# Patient Record
Sex: MALE | Race: WHITE | NOT HISPANIC OR LATINO | Employment: OTHER | URBAN - METROPOLITAN AREA
[De-identification: names, ages, dates, MRNs, and addresses within clinical notes are randomized per-mention and may not be internally consistent; named-entity substitution may affect disease eponyms.]

---

## 2017-01-01 ENCOUNTER — TELEPHONE (OUTPATIENT)
Dept: FAMILY MEDICINE CLINIC | Facility: CLINIC | Age: 82
End: 2017-01-01

## 2017-01-01 ENCOUNTER — OFFICE VISIT (OUTPATIENT)
Dept: FAMILY MEDICINE CLINIC | Facility: CLINIC | Age: 82
End: 2017-01-01

## 2017-01-01 ENCOUNTER — OFFICE VISIT (OUTPATIENT)
Dept: SURGERY | Facility: CLINIC | Age: 82
End: 2017-01-01

## 2017-01-01 ENCOUNTER — OUTSIDE FACILITY SERVICE (OUTPATIENT)
Dept: FAMILY MEDICINE CLINIC | Facility: CLINIC | Age: 82
End: 2017-01-01

## 2017-01-01 ENCOUNTER — ANESTHESIA EVENT (OUTPATIENT)
Dept: PERIOP | Facility: HOSPITAL | Age: 82
End: 2017-01-01

## 2017-01-01 ENCOUNTER — ANESTHESIA (OUTPATIENT)
Dept: PERIOP | Facility: HOSPITAL | Age: 82
End: 2017-01-01

## 2017-01-01 VITALS
RESPIRATION RATE: 16 BRPM | SYSTOLIC BLOOD PRESSURE: 115 MMHG | BODY MASS INDEX: 22.9 KG/M2 | OXYGEN SATURATION: 94 % | HEIGHT: 70 IN | HEART RATE: 70 BPM | WEIGHT: 160 LBS | TEMPERATURE: 98 F | DIASTOLIC BLOOD PRESSURE: 60 MMHG

## 2017-01-01 DIAGNOSIS — I10 ESSENTIAL HYPERTENSION: ICD-10-CM

## 2017-01-01 DIAGNOSIS — Z09 FOLLOW UP: Primary | ICD-10-CM

## 2017-01-01 DIAGNOSIS — D50.0 IRON DEFICIENCY ANEMIA DUE TO CHRONIC BLOOD LOSS: Primary | ICD-10-CM

## 2017-01-01 PROCEDURE — 25010000002 ONDANSETRON PER 1 MG: Performed by: NURSE ANESTHETIST, CERTIFIED REGISTERED

## 2017-01-01 PROCEDURE — 99213 OFFICE O/P EST LOW 20 MIN: CPT | Performed by: INTERNAL MEDICINE

## 2017-01-01 PROCEDURE — 99305 1ST NF CARE MODERATE MDM 35: CPT | Performed by: INTERNAL MEDICINE

## 2017-01-01 PROCEDURE — 25010000002 HYDROMORPHONE PER 4 MG: Performed by: NURSE ANESTHETIST, CERTIFIED REGISTERED

## 2017-01-01 PROCEDURE — 25010000002 PROPOFOL 10 MG/ML EMULSION: Performed by: NURSE ANESTHETIST, CERTIFIED REGISTERED

## 2017-01-01 PROCEDURE — 25010000002 PHENYLEPHRINE PER 1 ML: Performed by: NURSE ANESTHETIST, CERTIFIED REGISTERED

## 2017-01-01 PROCEDURE — 99024 POSTOP FOLLOW-UP VISIT: CPT | Performed by: SURGERY

## 2017-01-01 PROCEDURE — 25010000003 CEFAZOLIN IN DEXTROSE 2-4 GM/100ML-% SOLUTION: Performed by: PHYSICIAN ASSISTANT

## 2017-01-01 PROCEDURE — 25010000002 DEXAMETHASONE PER 1 MG: Performed by: NURSE ANESTHETIST, CERTIFIED REGISTERED

## 2017-01-01 PROCEDURE — 25010000002 FENTANYL CITRATE (PF) 100 MCG/2ML SOLUTION: Performed by: NURSE ANESTHETIST, CERTIFIED REGISTERED

## 2017-01-01 PROCEDURE — 25010000002 NEOSTIGMINE 10 MG/10ML SOLUTION: Performed by: NURSE ANESTHETIST, CERTIFIED REGISTERED

## 2017-01-01 RX ORDER — DEXAMETHASONE SODIUM PHOSPHATE 10 MG/ML
INJECTION INTRAMUSCULAR; INTRAVENOUS AS NEEDED
Status: DISCONTINUED | OUTPATIENT
Start: 2017-01-01 | End: 2017-01-01 | Stop reason: SURG

## 2017-01-01 RX ORDER — ONDANSETRON 2 MG/ML
INJECTION INTRAMUSCULAR; INTRAVENOUS AS NEEDED
Status: DISCONTINUED | OUTPATIENT
Start: 2017-01-01 | End: 2017-01-01 | Stop reason: SURG

## 2017-01-01 RX ORDER — NEOSTIGMINE METHYLSULFATE 1 MG/ML
INJECTION, SOLUTION INTRAVENOUS AS NEEDED
Status: DISCONTINUED | OUTPATIENT
Start: 2017-01-01 | End: 2017-01-01 | Stop reason: SURG

## 2017-01-01 RX ORDER — ROCURONIUM BROMIDE 10 MG/ML
INJECTION, SOLUTION INTRAVENOUS AS NEEDED
Status: DISCONTINUED | OUTPATIENT
Start: 2017-01-01 | End: 2017-01-01 | Stop reason: SURG

## 2017-01-01 RX ORDER — HYDROMORPHONE HCL 110MG/55ML
PATIENT CONTROLLED ANALGESIA SYRINGE INTRAVENOUS AS NEEDED
Status: DISCONTINUED | OUTPATIENT
Start: 2017-01-01 | End: 2017-01-01 | Stop reason: SURG

## 2017-01-01 RX ORDER — LIDOCAINE HYDROCHLORIDE 20 MG/ML
INJECTION, SOLUTION INFILTRATION; PERINEURAL AS NEEDED
Status: DISCONTINUED | OUTPATIENT
Start: 2017-01-01 | End: 2017-01-01 | Stop reason: SURG

## 2017-01-01 RX ORDER — GLYCOPYRROLATE 0.2 MG/ML
INJECTION INTRAMUSCULAR; INTRAVENOUS AS NEEDED
Status: DISCONTINUED | OUTPATIENT
Start: 2017-01-01 | End: 2017-01-01 | Stop reason: SURG

## 2017-01-01 RX ORDER — PROPOFOL 10 MG/ML
VIAL (ML) INTRAVENOUS AS NEEDED
Status: DISCONTINUED | OUTPATIENT
Start: 2017-01-01 | End: 2017-01-01 | Stop reason: SURG

## 2017-01-01 RX ORDER — FENTANYL CITRATE 50 UG/ML
INJECTION, SOLUTION INTRAMUSCULAR; INTRAVENOUS AS NEEDED
Status: DISCONTINUED | OUTPATIENT
Start: 2017-01-01 | End: 2017-01-01 | Stop reason: SURG

## 2017-01-01 RX ADMIN — ROCURONIUM BROMIDE 20 MG: 10 INJECTION INTRAVENOUS at 08:45

## 2017-01-01 RX ADMIN — GLYCOPYRROLATE 0.4 MG: 0.2 INJECTION INTRAMUSCULAR; INTRAVENOUS at 09:28

## 2017-01-01 RX ADMIN — NEOSTIGMINE METHYLSULFATE 2 MG: 1 INJECTION INTRAVENOUS at 09:28

## 2017-01-01 RX ADMIN — LIDOCAINE HYDROCHLORIDE 60 MG: 20 INJECTION, SOLUTION INFILTRATION; PERINEURAL at 07:41

## 2017-01-01 RX ADMIN — GLYCOPYRROLATE 0.5 MG: 0.2 INJECTION INTRAMUSCULAR; INTRAVENOUS at 09:22

## 2017-01-01 RX ADMIN — HYDROMORPHONE HYDROCHLORIDE 0.4 MG: 2 INJECTION, SOLUTION INTRAMUSCULAR; INTRAVENOUS; SUBCUTANEOUS at 09:02

## 2017-01-01 RX ADMIN — NEOSTIGMINE METHYLSULFATE 3 MG: 1 INJECTION INTRAVENOUS at 09:22

## 2017-01-01 RX ADMIN — FENTANYL CITRATE 50 MCG: 50 INJECTION INTRAMUSCULAR; INTRAVENOUS at 08:05

## 2017-01-01 RX ADMIN — CEFAZOLIN SODIUM 2 G: 2 INJECTION, SOLUTION INTRAVENOUS at 07:39

## 2017-01-01 RX ADMIN — ONDANSETRON 4 MG: 2 INJECTION INTRAMUSCULAR; INTRAVENOUS at 09:01

## 2017-01-01 RX ADMIN — SODIUM CHLORIDE, POTASSIUM CHLORIDE, SODIUM LACTATE AND CALCIUM CHLORIDE: 600; 310; 30; 20 INJECTION, SOLUTION INTRAVENOUS at 07:38

## 2017-01-01 RX ADMIN — PHENYLEPHRINE HYDROCHLORIDE 100 MCG: 10 INJECTION INTRAVENOUS at 08:04

## 2017-01-01 RX ADMIN — PHENYLEPHRINE HYDROCHLORIDE 100 MCG: 10 INJECTION INTRAVENOUS at 08:26

## 2017-01-01 RX ADMIN — ROCURONIUM BROMIDE 10 MG: 10 INJECTION INTRAVENOUS at 08:11

## 2017-01-01 RX ADMIN — ROCURONIUM BROMIDE 40 MG: 10 INJECTION INTRAVENOUS at 07:41

## 2017-01-01 RX ADMIN — FENTANYL CITRATE 50 MCG: 50 INJECTION INTRAMUSCULAR; INTRAVENOUS at 07:38

## 2017-01-01 RX ADMIN — PHENYLEPHRINE HYDROCHLORIDE 100 MCG: 10 INJECTION INTRAVENOUS at 07:54

## 2017-01-01 RX ADMIN — PROPOFOL 140 MG: 10 INJECTION, EMULSION INTRAVENOUS at 07:41

## 2017-01-01 RX ADMIN — PHENYLEPHRINE HYDROCHLORIDE 100 MCG: 10 INJECTION INTRAVENOUS at 08:46

## 2017-01-01 RX ADMIN — DEXAMETHASONE SODIUM PHOSPHATE 8 MG: 10 INJECTION INTRAMUSCULAR; INTRAVENOUS at 08:08

## 2017-01-03 PROBLEM — C18.2 MALIGNANT NEOPLASM OF ASCENDING COLON (HCC): Status: ACTIVE | Noted: 2017-01-01

## 2017-01-03 NOTE — ANESTHESIA PROCEDURE NOTES
Airway  Urgency: elective    Date/Time: 1/3/2017 7:46 AM  Airway not difficult    General Information and Staff    Patient location during procedure: OR  Anesthesiologist: NGUYỄN MCLEOD  CRNA: ANABELA CERVANTES    Indications and Patient Condition  Indications for airway management: airway protection    Preoxygenated: yes  MILS not maintained throughout  Mask difficulty assessment: 1 - vent by mask    Final Airway Details  Final airway type: endotracheal airway      Successful airway: ETT  Cuffed: yes   Successful intubation technique: direct laryngoscopy  Endotracheal tube insertion site: oral  Blade: Laura  Blade size: #3  ETT size: 8.0 mm  Cormack-Lehane Classification: grade I - full view of glottis  Placement verified by: chest auscultation and capnometry   Cuff volume (mL): 9  Measured from: lips  ETT to lips (cm): 24  Number of attempts at approach: 1    Additional Comments  Pt preoxygenated prior to induction, easy mask airway, atraumatic intubation,permanent dentures intact, + ETCO2, + bs bilat,  ETT secured and connected to ventilator.

## 2017-01-03 NOTE — ANESTHESIA PREPROCEDURE EVALUATION
Anesthesia Evaluation      Airway   Mallampati: II  TM distance: >3 FB  Neck ROM: full  no difficulty expected  Dental      Comment: Pt states dentures are permanently placed, possible damage was discussed    Pulmonary    (+) shortness of breath, sleep apnea (does not use CPAP),   Cardiovascular   (+) hypertension, past MI , CAD, CABG, dysrhythmias (pacemaker) Atrial Fib, CHF, PND,     Neuro/Psych  GI/Hepatic/Renal/Endo    (+)  hiatal hernia,     ROS Comment: Colon Ca    Musculoskeletal     Abdominal    Substance History      OB/GYN          Other                             Anesthesia Plan    ASA 4     general     intravenous induction   Anesthetic plan and risks discussed with patient.

## 2017-01-03 NOTE — ANESTHESIA POSTPROCEDURE EVALUATION
Patient: Richar Cee    Procedure Summary     Date Anesthesia Start Anesthesia Stop Room / Location    01/03/17 0738 0941  [facility] / [facility]       Procedure Diagnosis Surgeon Provider    LAPAROSCOPIC RIGHT COLON RESECTION (Right Abdomen) Malignant neoplasm of ascending colon  (Malignant neoplasm of ascending colon [C18.2]) MD Agueda Ibarra MD          Anesthesia Type: general  Last vitals  /69 (01/03/17 1600)    Temp      Pulse 69 (01/03/17 1600)   Resp 15 (01/03/17 1600)    SpO2 99 % (01/03/17 1600)      Post Anesthesia Care and Evaluation    Patient location during evaluation: PACU  Patient participation: complete - patient participated  Level of consciousness: awake and alert  Pain management: adequate  Airway patency: patent  Anesthetic complications: no  Cardiovascular status: acceptable  Respiratory status: acceptable  Hydration status: acceptable

## 2017-01-12 NOTE — PROGRESS NOTES
POSTOP VISIT    Laparoscopic right colectomy 1/3/17  T4a N1a tumor    Abdomen soft, NT, ND, incisions well healed.  Low appetite.    I discussed with him and his wife the pathology report and the option of seeing oncology to discuss adjuvant want treatment.  We discussed what would be involved and the pros and cons particularly at his age of 89.  I discussed this with him while he was in the hospital as well and he and his wife have thought about it and talk about it since then and after our discussion today he has no interest in pursuing further evaluation and/or treatment.  I agree with his decision wholeheartedly.  I'm going to put him in for a two-year surveillance colonoscopy reminder, but obviously his age and health will need to be taken into consideration prior to deciding to pursue that further.

## 2017-01-12 NOTE — MR AVS SNAPSHOT
Richar Cee   1/12/2017 10:15 AM   Office Visit    Dept Phone:  [phone]   Encounter #:  [id number]    Provider:  Tray Pozo MD   Department:  [facility] MED                Your Full Care Plan              Your Updated Medication List          This list is accurate as of: 1/12/17 10:34 AM.  Always use your most recent med list.                atorvastatin 80 MG tablet   Commonly known as:  LIPITOR       furosemide 80 MG tablet   Commonly known as:  LASIX       isosorbide mononitrate 60 MG 24 hr tablet   Commonly known as:  IMDUR       lansoprazole 30 MG capsule   Commonly known as:  PREVACID       metoprolol succinate XL 25 MG 24 hr tablet   Commonly known as:  TOPROL-XL       nitroglycerin 0.4 MG SL tablet   Commonly known as:  NITROSTAT       potassium chloride 20 MEQ CR tablet   Commonly known as:  K-DUR,KLOR-CON       ramipril 5 MG capsule   Commonly known as:  ALTACE       tamsulosin 0.4 MG capsule 24 hr capsule   Commonly known as:  FLOMAX               Instructions     None    Patient Instructions History      Upcoming Appointments     Visit Type Date Time Department    FOLLOW UP 1/12/2017 10:15 AM [facility]    POST-OP 1/12/2017  1:30 PM [facility]      MyChart Signup     Our records indicate that you have declined [facility] signup. If you would like to sign up for Nursing Home [facility], please email [email or web address] or call [phone] to obtain an activation code.             Other Info from Your Visit           Your Appointments     Jan 12, 2017  1:30 PM EST   Post-Op with Curt Schwarz MD   [facility] GENERAL SURGERY (--)    [address] [place] [address] [address]  [facility] [id number] [phone]              Allergies     No Known Allergies      Reason for Visit     Follow-up follow-up from hospital stay were a spot was removed from colon       Vital Signs     Blood Pressure Pulse  "Temperature Respirations Height Weight    115/60 70 98 °F (36.7 °C) (Oral) 16 70\" (177.8 cm) 160 lb (72.6 kg)    Oxygen Saturation Body Mass Index Smoking Status             94% 22.96 kg/m2 Former Smoker           "

## 2017-01-12 NOTE — PROGRESS NOTES
"Analia Cee is a 89 y.o. male. Patient is here today for   Chief Complaint   Patient presents with   • Follow-up     follow-up from hospital stay were a spot was removed from colon           Vitals:    01/12/17 1009   BP: 115/60   Pulse: 70   Resp: 16   Temp: 98 °F (36.7 °C)   SpO2: 94%       Past Medical History   Diagnosis Date   • Anemia 10/2016     5.6 IN SEPT 2016   5 UNITS OF BLOOD GIVEN   • Anticoagulated      ELIQUIS STOPPED IN SEPT WHILE HOSPITALIZED FOR HGB 5.6   • Atrial fibrillation      ELIQUIS, PLAVIX AND ASPIRIN STOPPED IN SEPT   HGB 5.6   • BPH (benign prostatic hyperplasia)    • CAD (coronary artery disease)    • CHF (congestive heart failure)    • Colon cancer 10/2016     Invasive Adenocarcinoma   • Colonic mass      CECUM CANCEROUS MASS   • Diverticulosis    • Hiatal hernia    • Hiatal hernia      NO PROBLEMS   • [place] (hard of hearing)      JULIANNA AIDS   • Hyperlipidemia    • Melanoma 1980     right shoulder   • Myocardial infarction    • GUI (obstructive sleep apnea)      has a CPAP-does not use   • Scoliosis concern      \"HE CANT LAY FLAT\"   • 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     Social History Main Topics   • Smoking status: Former Smoker     Years: 10.00     Types: Cigars     Quit date: 1986   • Smokeless tobacco: Never Used      Comment: ONLY SMOKED CIGARS WHEN I WAS DRIVING   • Alcohol use No   • Drug use: No   • Sexual activity: Not on file     Other Topics Concern   • Not on file     Social History Narrative        Current Outpatient Prescriptions:   •  atorvastatin (LIPITOR) 80 MG tablet, Take 80 mg by mouth Every Night., Disp: , Rfl:   •  furosemide (LASIX) 80 MG tablet, Take 80 mg by mouth 2 (Two) " "Times a Day. SOMETIMES I JUST TAKE ONE TABLET, Disp: , Rfl:   •  isosorbide mononitrate (IMDUR) 60 MG 24 hr tablet, Take 60 mg by mouth Every Morning., Disp: , Rfl:   •  lansoprazole (PREVACID) 30 MG capsule, Take 30 mg by mouth Every Morning., Disp: , Rfl:   •  metoprolol succinate XL (TOPROL-XL) 25 MG 24 hr tablet, Take 25 mg by mouth Every Morning., Disp: , Rfl:   •  nitroglycerin (NITROSTAT) 0.4 MG SL tablet, Place 0.4 mg under the tongue As Needed., Disp: , Rfl:   •  potassium chloride (K-DUR,KLOR-CON) 20 MEQ CR tablet, Take 20 mEq by mouth 2 (Two) Times a Day. STATED \"SOMEIMES I ONLY TAKE IT ONCE A DAY\", Disp: , Rfl:   •  ramipril (ALTACE) 5 MG capsule, Take 5 mg by mouth Every Morning., Disp: , Rfl:   •  tamsulosin (FLOMAX) 0.4 MG capsule 24 hr capsule, Take 1 capsule by mouth Daily. AFTER LUNCH, Disp: , Rfl:      Objective     HPI Comments: This patient is accompanied by his wife today.  He was discharged from the hospital not long ago and had a colon resection for a colon cancer by Dr. Schwarz.  He and his wife tell me that they have no intention of following up with oncology and that seems to be reasonable plan considering his advanced age.    He is a very pleasant and articulate person per usual.  He tells me that he has wife are going at the lunch today and then going to see Dr. Schwarz later.       Review of Systems   Constitutional: Negative.    HENT: Negative.    Respiratory: Negative.    Musculoskeletal: Negative.    Psychiatric/Behavioral: Negative.        Physical Exam   Constitutional: He is oriented to person, place, and time. He appears well-developed and well-nourished.   Cardiovascular: Normal rate and regular rhythm.    Pulmonary/Chest: Effort normal and breath sounds normal.   Neurological: He is alert and oriented to person, place, and time.   Psychiatric: He has a normal mood and affect. His behavior is normal.   Nursing note and vitals reviewed.        Problem List Items Addressed This " Visit        Cardiovascular and Mediastinum    Essential hypertension       Hematopoietic and Hemostatic    Iron deficiency anemia - Primary            PLAN  His hypertension is well-controlled.    I gave him a note to give the visiting nurse requesting a copy of his most recent labs.  If he remains anemic, it would probably be good idea for him to start oral iron for the short-term anyway.    I'll let him know what the plan is when I get the results of his labs.  No Follow-up on file.

## 2017-01-12 NOTE — MR AVS SNAPSHOT
Richar Cee   1/12/2017 1:30 PM   Office Visit    Dept Phone:  [phone]   Encounter #:  [id number]    Provider:  Curt Schwarz MD   Department:  [facility] GENERAL SURGERY                Your Full Care Plan              Your Updated Medication List          This list is accurate as of: 1/12/17  1:54 PM.  Always use your most recent med list.                atorvastatin 80 MG tablet   Commonly known as:  LIPITOR       furosemide 80 MG tablet   Commonly known as:  LASIX       isosorbide mononitrate 60 MG 24 hr tablet   Commonly known as:  IMDUR       lansoprazole 30 MG capsule   Commonly known as:  PREVACID       metoprolol succinate XL 25 MG 24 hr tablet   Commonly known as:  TOPROL-XL       nitroglycerin 0.4 MG SL tablet   Commonly known as:  NITROSTAT       potassium chloride 20 MEQ CR tablet   Commonly known as:  K-DUR,KLOR-CON       ramipril 5 MG capsule   Commonly known as:  ALTACE       tamsulosin 0.4 MG capsule 24 hr capsule   Commonly known as:  FLOMAX               You Were Diagnosed With        Codes Comments    Follow up    -  Primary ICD-10-CM: Z09  ICD-9-CM: V67.9       Instructions     None    Patient Instructions History      Upcoming Appointments     Visit Type Date Time Department    FOLLOW UP 1/12/2017 10:15 AM [facility]    POST-OP 1/12/2017  1:30 PM [facility]    [facility] 7/13/2017  9:30 AM [facility]    FOLLOW UP 7/20/2017 10:45 AM [facility]      MyChart Signup     Our records indicate that you have declined [facility] MyChart signup. If you would like to sign up for MyChart, please email [email or web address] or call [phone] to obtain an activation code.             Other Info from Your Visit           Your Appointments     Jul 13, 2017  9:30 AM EDT   [facility] (--)    [address] [facility] [address]      [phone]            Jul 20, 2017 10:45 AM EDT   Follow Up with Tray Pozo MD   [facility] FAMILY AND INTERNAL MED (--)    [address] [facility] [id number] [phone]           Arrive 15 minutes prior to appointment.              Allergies     No Known Allergies      Reason for Visit     Post-op PO Laparoscopic right colectomy 1/3/17      Vital Signs     Smoking Status                   Former Smoker           Problems and Diagnoses Noted     Follow up    -  Primary

## 2017-03-23 NOTE — TELEPHONE ENCOUNTER
ORDER FAXED   ----- Message from Hi Mitchell MA sent at 3/23/2017 12:53 PM EDT -----  Contact: [facility] CALLED, THEY NEED ORDERS TO EVALUATE PATIENT.  THEY NEED   ORDER, LAST OFFICE VISIT, MED LIST, LAST LAB FAXED TO THEM [phone].